# Patient Record
Sex: MALE | Race: WHITE | NOT HISPANIC OR LATINO | Employment: FULL TIME | RURAL
[De-identification: names, ages, dates, MRNs, and addresses within clinical notes are randomized per-mention and may not be internally consistent; named-entity substitution may affect disease eponyms.]

---

## 2022-05-02 ENCOUNTER — LAB VISIT (OUTPATIENT)
Dept: LAB | Facility: CLINIC | Age: 62
End: 2022-05-02
Payer: COMMERCIAL

## 2022-05-02 DIAGNOSIS — Z12.5 SCREENING PSA (PROSTATE SPECIFIC ANTIGEN): Primary | ICD-10-CM

## 2022-05-02 DIAGNOSIS — R25.2 MUSCLE CRAMPS: ICD-10-CM

## 2022-05-02 LAB
ALBUMIN SERPL BCP-MCNC: 3.7 G/DL (ref 3.5–5)
ALBUMIN/GLOB SERPL: 0.9 {RATIO}
ALP SERPL-CCNC: 90 U/L (ref 45–115)
ALT SERPL W P-5'-P-CCNC: 24 U/L (ref 16–61)
ANION GAP SERPL CALCULATED.3IONS-SCNC: 14 MMOL/L (ref 7–16)
AST SERPL W P-5'-P-CCNC: 17 U/L (ref 15–37)
BILIRUB SERPL-MCNC: 0.6 MG/DL (ref 0–1.2)
BUN SERPL-MCNC: 17 MG/DL (ref 7–18)
BUN/CREAT SERPL: 16 (ref 6–20)
CALCIUM SERPL-MCNC: 8.8 MG/DL (ref 8.5–10.1)
CHLORIDE SERPL-SCNC: 106 MMOL/L (ref 98–107)
CO2 SERPL-SCNC: 24 MMOL/L (ref 21–32)
CREAT SERPL-MCNC: 1.04 MG/DL (ref 0.7–1.3)
GLOBULIN SER-MCNC: 3.9 G/DL (ref 2–4)
GLUCOSE SERPL-MCNC: 67 MG/DL (ref 74–106)
MAGNESIUM SERPL-MCNC: 2.2 MG/DL (ref 1.7–2.3)
PHOSPHATE SERPL-MCNC: 2.8 MG/DL (ref 2.5–4.5)
POTASSIUM SERPL-SCNC: 3.8 MMOL/L (ref 3.5–5.1)
PROT SERPL-MCNC: 7.6 G/DL (ref 6.4–8.2)
PSA SERPL-MCNC: 0.58 NG/ML (ref 0–4.1)
SODIUM SERPL-SCNC: 140 MMOL/L (ref 136–145)
T4 FREE SERPL-MCNC: 1.62 NG/DL (ref 0.76–1.46)
TSH SERPL DL<=0.005 MIU/L-ACNC: 1.98 UIU/ML (ref 0.36–3.74)

## 2022-05-02 PROCEDURE — 84439 T4, FREE: ICD-10-PCS | Mod: ,,, | Performed by: CLINICAL MEDICAL LABORATORY

## 2022-05-02 PROCEDURE — 80053 COMPREHEN METABOLIC PANEL: CPT | Mod: ,,, | Performed by: CLINICAL MEDICAL LABORATORY

## 2022-05-02 PROCEDURE — 84100 ASSAY OF PHOSPHORUS: CPT | Mod: ,,, | Performed by: CLINICAL MEDICAL LABORATORY

## 2022-05-02 PROCEDURE — 80053 COMPREHENSIVE METABOLIC PANEL: ICD-10-PCS | Mod: ,,, | Performed by: CLINICAL MEDICAL LABORATORY

## 2022-05-02 PROCEDURE — 83735 MAGNESIUM: ICD-10-PCS | Mod: ,,, | Performed by: CLINICAL MEDICAL LABORATORY

## 2022-05-02 PROCEDURE — 84439 ASSAY OF FREE THYROXINE: CPT | Mod: ,,, | Performed by: CLINICAL MEDICAL LABORATORY

## 2022-05-02 PROCEDURE — G0103 PSA SCREENING: HCPCS | Mod: ,,, | Performed by: CLINICAL MEDICAL LABORATORY

## 2022-05-02 PROCEDURE — 84100 PHOSPHORUS: ICD-10-PCS | Mod: ,,, | Performed by: CLINICAL MEDICAL LABORATORY

## 2022-05-02 PROCEDURE — 84443 ASSAY THYROID STIM HORMONE: CPT | Mod: ,,, | Performed by: CLINICAL MEDICAL LABORATORY

## 2022-05-02 PROCEDURE — 83735 ASSAY OF MAGNESIUM: CPT | Mod: ,,, | Performed by: CLINICAL MEDICAL LABORATORY

## 2022-05-02 PROCEDURE — 84443 TSH: ICD-10-PCS | Mod: ,,, | Performed by: CLINICAL MEDICAL LABORATORY

## 2022-05-02 PROCEDURE — G0103 PSA, SCREENING: ICD-10-PCS | Mod: ,,, | Performed by: CLINICAL MEDICAL LABORATORY

## 2022-07-12 RX ORDER — ATENOLOL 25 MG/1
25 TABLET ORAL DAILY PRN
Qty: 30 TABLET | Refills: 2 | Status: SHIPPED | OUTPATIENT
Start: 2022-07-12 | End: 2023-09-29 | Stop reason: SDUPTHER

## 2022-07-12 RX ORDER — ATENOLOL 25 MG/1
25 TABLET ORAL DAILY
COMMUNITY
End: 2022-07-12 | Stop reason: SDUPTHER

## 2023-09-29 RX ORDER — ATENOLOL 25 MG/1
25 TABLET ORAL DAILY PRN
Qty: 30 TABLET | Refills: 2 | Status: SHIPPED | OUTPATIENT
Start: 2023-09-29

## 2024-07-29 ENCOUNTER — OFFICE VISIT (OUTPATIENT)
Dept: FAMILY MEDICINE | Facility: CLINIC | Age: 64
End: 2024-07-29
Payer: COMMERCIAL

## 2024-07-29 VITALS
WEIGHT: 144.63 LBS | HEIGHT: 70 IN | OXYGEN SATURATION: 97 % | SYSTOLIC BLOOD PRESSURE: 111 MMHG | DIASTOLIC BLOOD PRESSURE: 71 MMHG | BODY MASS INDEX: 20.7 KG/M2 | TEMPERATURE: 98 F | HEART RATE: 71 BPM

## 2024-07-29 DIAGNOSIS — M79.652 LEFT THIGH PAIN: Primary | ICD-10-CM

## 2024-07-29 DIAGNOSIS — R26.9 ABNORMAL GAIT: ICD-10-CM

## 2024-07-29 PROCEDURE — 1101F PT FALLS ASSESS-DOCD LE1/YR: CPT | Mod: ,,, | Performed by: NURSE PRACTITIONER

## 2024-07-29 PROCEDURE — 3008F BODY MASS INDEX DOCD: CPT | Mod: ,,, | Performed by: NURSE PRACTITIONER

## 2024-07-29 PROCEDURE — 1160F RVW MEDS BY RX/DR IN RCRD: CPT | Mod: ,,, | Performed by: NURSE PRACTITIONER

## 2024-07-29 PROCEDURE — 1159F MED LIST DOCD IN RCRD: CPT | Mod: ,,, | Performed by: NURSE PRACTITIONER

## 2024-07-29 PROCEDURE — 3074F SYST BP LT 130 MM HG: CPT | Mod: ,,, | Performed by: NURSE PRACTITIONER

## 2024-07-29 PROCEDURE — 99203 OFFICE O/P NEW LOW 30 MIN: CPT | Mod: ,,, | Performed by: NURSE PRACTITIONER

## 2024-07-29 PROCEDURE — 3288F FALL RISK ASSESSMENT DOCD: CPT | Mod: ,,, | Performed by: NURSE PRACTITIONER

## 2024-07-29 PROCEDURE — 3078F DIAST BP <80 MM HG: CPT | Mod: ,,, | Performed by: NURSE PRACTITIONER

## 2024-07-29 RX ORDER — METHYLPREDNISOLONE 4 MG/1
TABLET ORAL
Qty: 21 EACH | Refills: 0 | Status: SHIPPED | OUTPATIENT
Start: 2024-07-29 | End: 2024-08-19

## 2024-07-29 RX ORDER — CYCLOBENZAPRINE HCL 10 MG
10 TABLET ORAL 3 TIMES DAILY PRN
Qty: 30 TABLET | Refills: 0 | Status: SHIPPED | OUTPATIENT
Start: 2024-07-29 | End: 2024-08-08

## 2024-07-30 RX ORDER — METHOCARBAMOL 500 MG/1
500 TABLET, FILM COATED ORAL 4 TIMES DAILY PRN
Qty: 40 TABLET | Refills: 0 | Status: SHIPPED | OUTPATIENT
Start: 2024-07-30

## 2024-07-30 RX ORDER — GABAPENTIN 100 MG/1
100 CAPSULE ORAL 3 TIMES DAILY
Qty: 90 CAPSULE | Refills: 1 | Status: SHIPPED | OUTPATIENT
Start: 2024-07-30 | End: 2025-07-30

## 2024-07-30 NOTE — PROGRESS NOTES
Clinic Note    Az Haddad is a 64 y.o. male     Chief Complaint:   Chief Complaint   Patient presents with    Leg Pain     Left upper leg pain         Subjective:    Patient complains of left upper leg/thigh pain. Admits to limp with ambulation. States symptoms for over a year. Denies injury. States pain is worse after walking on uneven ground or long distance. Admits to burning sensation at times to thigh. States he has to  his leg to move it at times. For example when pressing clutch on the tractor he manually grabs leg to lift over to clutch. Denies swelling. Occasional pain to hip and knee but primarily to thigh.     Leg Pain          Allergies:   Review of patient's allergies indicates:   Allergen Reactions    Shellfish containing products         Past Medical History:  History reviewed. No pertinent past medical history.     Current Medications:    Current Outpatient Medications:     atenoloL (TENORMIN) 25 MG tablet, Take 1 tablet (25 mg total) by mouth daily as needed (palpitations)., Disp: 30 tablet, Rfl: 2    cyclobenzaprine (FLEXERIL) 10 MG tablet, Take 1 tablet (10 mg total) by mouth 3 (three) times daily as needed for Muscle spasms., Disp: 30 tablet, Rfl: 0    gabapentin (NEURONTIN) 100 MG capsule, Take 1 capsule (100 mg total) by mouth 3 (three) times daily., Disp: 90 capsule, Rfl: 1    methocarbamoL (ROBAXIN) 500 MG Tab, Take 1 tablet (500 mg total) by mouth 4 (four) times daily as needed., Disp: 40 tablet, Rfl: 0    methylPREDNISolone (MEDROL DOSEPACK) 4 mg tablet, use as directed, Disp: 21 each, Rfl: 0       Review of Systems   Constitutional:  Negative for fever.   Respiratory:  Negative for cough and shortness of breath.    Cardiovascular:  Negative for chest pain.   Gastrointestinal:  Negative for abdominal pain.   Musculoskeletal:  Positive for gait problem and leg pain. Negative for back pain.   Neurological:  Negative for headaches.          Objective:    /71 (BP Location:  "Left arm, Patient Position: Sitting, BP Method: Large (Automatic))   Pulse 71   Temp 98.1 °F (36.7 °C) (Oral)   Ht 5' 10" (1.778 m)   Wt 65.6 kg (144 lb 9.6 oz)   SpO2 97%   BMI 20.75 kg/m²      Physical Exam  Constitutional:       Appearance: Normal appearance.   Eyes:      Extraocular Movements: Extraocular movements intact.   Cardiovascular:      Rate and Rhythm: Normal rate and regular rhythm.      Pulses: Normal pulses.      Heart sounds: Normal heart sounds.   Pulmonary:      Effort: Pulmonary effort is normal.      Breath sounds: Normal breath sounds.   Musculoskeletal:      Lumbar back: No tenderness or bony tenderness. Normal range of motion.      Right lower leg: No edema.      Left lower leg: No edema.      Comments: Ambulates with notable limp to lle   Neurological:      Mental Status: He is alert and oriented to person, place, and time.      Gait: Gait abnormal.          Assessment and Plan:    1. Left thigh pain    2. Abnormal gait         Left thigh pain  -     methylPREDNISolone (MEDROL DOSEPACK) 4 mg tablet; use as directed  Dispense: 21 each; Refill: 0  -     cyclobenzaprine (FLEXERIL) 10 MG tablet; Take 1 tablet (10 mg total) by mouth 3 (three) times daily as needed for Muscle spasms.  Dispense: 30 tablet; Refill: 0  -     X-Ray Lumbar Spine AP And Lateral; Future; Expected date: 07/30/2024  -     X-Ray Hip 2 or 3 views Left with Pelvis when performed; Future; Expected date: 07/30/2024  -     methocarbamoL (ROBAXIN) 500 MG Tab; Take 1 tablet (500 mg total) by mouth 4 (four) times daily as needed.  Dispense: 40 tablet; Refill: 0  -     gabapentin (NEURONTIN) 100 MG capsule; Take 1 capsule (100 mg total) by mouth 3 (three) times daily.  Dispense: 90 capsule; Refill: 1    Abnormal gait  -     methylPREDNISolone (MEDROL DOSEPACK) 4 mg tablet; use as directed  Dispense: 21 each; Refill: 0  -     cyclobenzaprine (FLEXERIL) 10 MG tablet; Take 1 tablet (10 mg total) by mouth 3 (three) times daily as " needed for Muscle spasms.  Dispense: 30 tablet; Refill: 0  -     X-Ray Lumbar Spine AP And Lateral; Future; Expected date: 07/30/2024  -     X-Ray Hip 2 or 3 views Left with Pelvis when performed; Future; Expected date: 07/30/2024  -     methocarbamoL (ROBAXIN) 500 MG Tab; Take 1 tablet (500 mg total) by mouth 4 (four) times daily as needed.  Dispense: 40 tablet; Refill: 0  -     gabapentin (NEURONTIN) 100 MG capsule; Take 1 capsule (100 mg total) by mouth 3 (three) times daily.  Dispense: 90 capsule; Refill: 1          There are no Patient Instructions on file for this visit.   Follow up if symptoms worsen or fail to improve.

## 2024-08-03 ENCOUNTER — HOSPITAL ENCOUNTER (OUTPATIENT)
Dept: RADIOLOGY | Facility: HOSPITAL | Age: 64
Discharge: HOME | End: 2024-08-03
Attending: NURSE PRACTITIONER
Payer: COMMERCIAL

## 2024-08-03 DIAGNOSIS — M79.652 LEFT THIGH PAIN: ICD-10-CM

## 2024-08-03 DIAGNOSIS — R26.9 ABNORMAL GAIT: ICD-10-CM

## 2024-08-03 PROCEDURE — 73502 X-RAY EXAM HIP UNI 2-3 VIEWS: CPT | Mod: TC,LT

## 2024-08-03 PROCEDURE — 72100 X-RAY EXAM L-S SPINE 2/3 VWS: CPT | Mod: TC

## 2024-08-05 DIAGNOSIS — R26.9 ABNORMAL GAIT: ICD-10-CM

## 2024-08-05 DIAGNOSIS — M25.852 FEMOROACETABULAR IMPINGEMENT OF LEFT HIP: Primary | ICD-10-CM

## 2024-08-27 ENCOUNTER — OFFICE VISIT (OUTPATIENT)
Dept: ORTHOPEDICS | Facility: CLINIC | Age: 64
End: 2024-08-27
Payer: COMMERCIAL

## 2024-08-27 VITALS — BODY MASS INDEX: 21.47 KG/M2 | WEIGHT: 150 LBS | HEIGHT: 70 IN

## 2024-08-27 DIAGNOSIS — R26.9 ABNORMAL GAIT: ICD-10-CM

## 2024-08-27 DIAGNOSIS — M25.852 FEMOROACETABULAR IMPINGEMENT OF LEFT HIP: ICD-10-CM

## 2024-08-27 PROCEDURE — 99999 PR PBB SHADOW E&M-EST. PATIENT-LVL III: CPT | Mod: PBBFAC,,,

## 2024-08-27 PROCEDURE — 99203 OFFICE O/P NEW LOW 30 MIN: CPT | Mod: S$PBB,,,

## 2024-08-27 PROCEDURE — 1159F MED LIST DOCD IN RCRD: CPT | Mod: ,,,

## 2024-08-27 PROCEDURE — 99213 OFFICE O/P EST LOW 20 MIN: CPT | Mod: PBBFAC

## 2024-08-27 PROCEDURE — 3008F BODY MASS INDEX DOCD: CPT | Mod: ,,,

## 2024-08-27 RX ORDER — MELOXICAM 15 MG/1
15 TABLET ORAL DAILY
Qty: 30 TABLET | Refills: 2 | Status: SHIPPED | OUTPATIENT
Start: 2024-08-27 | End: 2024-11-25

## 2024-08-27 NOTE — PROGRESS NOTES
CC:   Chief Complaint   Patient presents with    Left Hip - Pain          Az Haddad is a 64 y.o. male seen today for Pain of the Left Hip  Patient presents today with complaints of left hip and thigh pain worsening over the past 2 months.  He reports that he is now walking with a limp.  He also reports weakness in his left hip and thigh.  He reports when he was on his tractor he has to manually  his left leg and move it or which of the clutch.  He denies any recent fall or injury.  He does admit to groin pain.  He was recently prescribed Neurontin which she states helps with the thigh pain.  No other complaints today.      PAST MEDICAL HISTORY: History reviewed. No pertinent past medical history.       PAST SURGICAL HISTORY:   Past Surgical History:   Procedure Laterality Date    cardiac catherization            ALLERGIES:   Review of patient's allergies indicates:   Allergen Reactions    [place] grass     Shellfish containing products         MEDICATIONS :    Current Outpatient Medications:     atenoloL (TENORMIN) 25 MG tablet, Take 1 tablet (25 mg total) by mouth daily as needed (palpitations)., Disp: 30 tablet, Rfl: 2    gabapentin (NEURONTIN) 100 MG capsule, Take 1 capsule (100 mg total) by mouth 3 (three) times daily., Disp: 90 capsule, Rfl: 1    methocarbamoL (ROBAXIN) 500 MG Tab, Take 1 tablet (500 mg total) by mouth 4 (four) times daily as needed., Disp: 40 tablet, Rfl: 0    meloxicam (MOBIC) 15 MG tablet, Take 1 tablet (15 mg total) by mouth once daily., Disp: 30 tablet, Rfl: 2     SOCIAL HISTORY:   Social History     Socioeconomic History    Marital status:    Tobacco Use    Smoking status: Never    Smokeless tobacco: Never   Substance and Sexual Activity    Alcohol use: Not Currently    Drug use: Never    Sexual activity: Yes     Partners: Female        FAMILY HISTORY: No family history on file.       PHYSICAL EXAM:      There were no vitals filed for this visit.  Body  mass index is 21.52 kg/m².    GENERAL: Well-developed, well-nourished male . The patient is alert, oriented and cooperative.    HEENT:  Normocephalic, atraumatic.  Extraocular movements are intact bilaterally.     NECK:  Nontender with good range of motion.    LUNGS:  Clear to auscultation bilaterally.    HEART:  Regular rate and rhythm.     ABDOMEN:  Soft, non-tender, non-distended.      EXTREMITIES:   Left hip with skin clean dry and intact, nontender to palpation over greater trochanteric bursa, good range of motion with hip flexion internal and external rotation however there is pain elicited upon internal rotation of the hip, mildly positive straight leg raise, neurovascularly intact      RADIOGRAPHIC FINDINGS:   X-Ray Lumbar Spine AP And Lateral    Result Date: 8/3/2024  EXAMINATION: XR LUMBAR SPINE AP AND LATERAL CLINICAL HISTORY: Pain in left thigh COMPARISON: None TECHNIQUE: Frontal, lateral, and coned-down L5-S1 views of the lumbosacral spine. FINDINGS: There is straightening of normal lumbar lordosis which may be positional or secondary to muscle spasm. There is no significant anterolisthesis or retrolisthesis.  Multilevel mild to moderate loss of disc space height and marginal osteophyte formation.  Scattered posterior facet arthropathy.  Degenerative change of the bilateral hips.     Degenerative change and straightening/malalignment of the lumbar spine as detailed above. Point of Service: [facility] Electronically signed by: Jayme Melendez Date:    08/03/2024 Time:    14:41    X-Ray Hip 2 or 3 views Left with Pelvis when performed    Result Date: 8/3/2024  EXAMINATION: XR HIP WITH PELVIS WHEN PERFORMED 2 OR 3 VIEWS LEFT CLINICAL HISTORY: Pain in left thigh COMPARISON: None TECHNIQUE: Single frontal view of the pelvis as well as frontal and frogleg lateral views of the left hip. FINDINGS: Moderate degenerative change of the bilateral hips.  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 No acute fracture or dislocation.     As above. Point of Service: [facility] Electronically signed by: Jayme Melendez Date:    08/03/2024 Time:    14:39       There are no problems to display for this patient.    IMPRESSION AND PLAN:  Left hip and thigh pain.  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  Also potentially a degree of lumbosacral radiculopathy as he is having weakness in his left lower leg.  Discussed all treatment options with the patient today.  Recommend continuing Neurontin as prescribed.  Also recommending anti-inflammatories we will prescribe Mobic today.  If patient continues to have pain follow up in 1 month and we will consider further treatment options.  Otherwise follow up p.r.n..    No follow-ups on file.       Fang Gates PA-C      (Subject to voice recognition error, transcription service not allowed)

## 2024-09-23 ENCOUNTER — HOSPITAL ENCOUNTER (OUTPATIENT)
Dept: RADIOLOGY | Facility: HOSPITAL | Age: 64
Discharge: HOME | End: 2024-09-23
Attending: NURSE PRACTITIONER
Payer: COMMERCIAL

## 2024-09-23 ENCOUNTER — OFFICE VISIT (OUTPATIENT)
Dept: FAMILY MEDICINE | Facility: CLINIC | Age: 64
End: 2024-09-23
Payer: COMMERCIAL

## 2024-09-23 VITALS
HEIGHT: 70 IN | TEMPERATURE: 98 F | DIASTOLIC BLOOD PRESSURE: 60 MMHG | WEIGHT: 142 LBS | RESPIRATION RATE: 20 BRPM | HEART RATE: 70 BPM | OXYGEN SATURATION: 95 % | SYSTOLIC BLOOD PRESSURE: 98 MMHG | BODY MASS INDEX: 20.33 KG/M2

## 2024-09-23 DIAGNOSIS — M79.605 PAIN OF LEFT LOWER EXTREMITY: ICD-10-CM

## 2024-09-23 DIAGNOSIS — M79.605 PAIN OF LEFT LOWER EXTREMITY: Primary | ICD-10-CM

## 2024-09-23 DIAGNOSIS — R00.2 PALPITATIONS: ICD-10-CM

## 2024-09-23 PROCEDURE — 3288F FALL RISK ASSESSMENT DOCD: CPT | Mod: ,,, | Performed by: NURSE PRACTITIONER

## 2024-09-23 PROCEDURE — 93971 EXTREMITY STUDY: CPT | Mod: TC,LT

## 2024-09-23 PROCEDURE — 1101F PT FALLS ASSESS-DOCD LE1/YR: CPT | Mod: ,,, | Performed by: NURSE PRACTITIONER

## 2024-09-23 PROCEDURE — 3074F SYST BP LT 130 MM HG: CPT | Mod: ,,, | Performed by: NURSE PRACTITIONER

## 2024-09-23 PROCEDURE — 3008F BODY MASS INDEX DOCD: CPT | Mod: ,,, | Performed by: NURSE PRACTITIONER

## 2024-09-23 PROCEDURE — 1159F MED LIST DOCD IN RCRD: CPT | Mod: ,,, | Performed by: NURSE PRACTITIONER

## 2024-09-23 PROCEDURE — 1160F RVW MEDS BY RX/DR IN RCRD: CPT | Mod: ,,, | Performed by: NURSE PRACTITIONER

## 2024-09-23 PROCEDURE — 3078F DIAST BP <80 MM HG: CPT | Mod: ,,, | Performed by: NURSE PRACTITIONER

## 2024-09-23 PROCEDURE — 93971 EXTREMITY STUDY: CPT | Mod: 26,LT,, | Performed by: RADIOLOGY

## 2024-09-23 PROCEDURE — 99213 OFFICE O/P EST LOW 20 MIN: CPT | Mod: ,,, | Performed by: NURSE PRACTITIONER

## 2024-09-23 RX ORDER — ATENOLOL 25 MG/1
25 TABLET ORAL DAILY PRN
Qty: 30 TABLET | Refills: 2 | Status: SHIPPED | OUTPATIENT
Start: 2024-09-23

## 2024-09-23 NOTE — PROGRESS NOTES
Clinic Note    Az Haddad is a 64 y.o. male     Chief Complaint:   Chief Complaint   Patient presents with    Health Maintenance     Hepatitis C Screening Never done  Lipid Panel Never done  HIV Screening Never done  TETANUS VACCINE Never done  Colorectal Cancer Screening Never done  Shingles Vaccine(1 of 2) Never done  RSV Vaccine (Age 60+ and Pregnant patients)(1 - 1-dose 60+ series) Never done  Pneumococcal Vaccines (Age 65+)(1 of 1 - PCV) Never done  Influenza Vaccine(1) Never done  COVID-19 Vaccine(1 - 2023-24 season) Never done     Leg Pain     Left pain on leg calf denies injury        Subjective:    Patient complains of left calf pain. Worse with weight bearing. Denies injury. Pain started yesterday and has had no improvement. Denies cramping. States pin point area is tender to touch. Denies erythema or swelling. Denies hx of dvt.    Leg Pain          Allergies:   Review of patient's allergies indicates:   Allergen Reactions    [place] grass     Shellfish containing products         Past Medical History:  History reviewed. No pertinent past medical history.     Current Medications:    Current Outpatient Medications:     atenoloL (TENORMIN) 25 MG tablet, Take 1 tablet (25 mg total) by mouth daily as needed (palpitations)., Disp: 30 tablet, Rfl: 2    gabapentin (NEURONTIN) 100 MG capsule, Take 1 capsule (100 mg total) by mouth 3 (three) times daily., Disp: 90 capsule, Rfl: 1    meloxicam (MOBIC) 15 MG tablet, Take 1 tablet (15 mg total) by mouth once daily., Disp: 30 tablet, Rfl: 2    methocarbamoL (ROBAXIN) 500 MG Tab, Take 1 tablet (500 mg total) by mouth 4 (four) times daily as needed., Disp: 40 tablet, Rfl: 0       Review of Systems   Constitutional:  Negative for fever.   Respiratory:  Negative for cough and shortness of breath.    Cardiovascular:  Negative for chest pain, palpitations and leg swelling.   Gastrointestinal:  Negative for abdominal pain, constipation, diarrhea, nausea and vomiting.  "  Genitourinary:  Negative for dysuria.   Musculoskeletal:  Positive for leg pain.   Neurological:  Negative for headaches.          Objective:    BP 98/60 (BP Location: Left arm, Patient Position: Sitting, BP Method: Medium (Manual))   Pulse 70   Temp 98 °F (36.7 °C) (Oral)   Resp 20   Ht 5' 10" (1.778 m)   Wt 64.4 kg (142 lb)   SpO2 95%   BMI 20.37 kg/m²      Physical Exam  Constitutional:       Appearance: Normal appearance.   Eyes:      Extraocular Movements: Extraocular movements intact.   Cardiovascular:      Rate and Rhythm: Normal rate and regular rhythm.      Pulses: Normal pulses.      Heart sounds: Normal heart sounds.   Pulmonary:      Effort: Pulmonary effort is normal.      Breath sounds: Normal breath sounds.   Musculoskeletal:      Right lower leg: No edema.      Left lower leg: Tenderness present. No bony tenderness. No edema.        Legs:    Neurological:      Mental Status: He is alert and oriented to person, place, and time.          Assessment and Plan:    1. Pain of left lower extremity    2. Palpitations         Pain of left lower extremity  -     US Lower Extremity Veins Left; Future; Expected date: 09/23/2024    Palpitations  -     atenoloL (TENORMIN) 25 MG tablet; Take 1 tablet (25 mg total) by mouth daily as needed (palpitations).  Dispense: 30 tablet; Refill: 2          There are no Patient Instructions on file for this visit.   Follow up if symptoms worsen or fail to improve.     "

## 2025-01-31 RX ORDER — NYSTATIN 100000 [USP'U]/ML
SUSPENSION ORAL
Qty: 180 ML | Refills: 0 | Status: SHIPPED | OUTPATIENT
Start: 2025-01-31

## 2025-01-31 RX ORDER — AMOXICILLIN 500 MG/1
CAPSULE ORAL
Qty: 30 CAPSULE | Refills: 0 | Status: SHIPPED | OUTPATIENT
Start: 2025-01-31

## 2025-07-01 DIAGNOSIS — M79.652 LEFT THIGH PAIN: ICD-10-CM

## 2025-07-01 DIAGNOSIS — R26.9 ABNORMAL GAIT: ICD-10-CM

## 2025-07-01 RX ORDER — METHYLPREDNISOLONE 4 MG/1
TABLET ORAL
Qty: 21 TABLET | Refills: 0 | Status: SHIPPED | OUTPATIENT
Start: 2025-07-01

## 2025-07-28 RX ORDER — AMOXICILLIN 500 MG/1
CAPSULE ORAL
Qty: 30 CAPSULE | Refills: 0 | Status: SHIPPED | OUTPATIENT
Start: 2025-07-28